# Patient Record
Sex: FEMALE | URBAN - METROPOLITAN AREA
[De-identification: names, ages, dates, MRNs, and addresses within clinical notes are randomized per-mention and may not be internally consistent; named-entity substitution may affect disease eponyms.]

---

## 2017-10-23 ENCOUNTER — TELEPHONE (OUTPATIENT)
Dept: NEUROLOGY | Facility: CLINIC | Age: 42
End: 2017-10-23

## 2017-10-23 NOTE — TELEPHONE ENCOUNTER
LM for pt advising new patient consult scheduled with Dr Hoffman 11/30 per Dr Gale from [facility]. Will mail out appt slip.

## 2019-10-09 ENCOUNTER — OFFICE (OUTPATIENT)
Dept: URBAN - METROPOLITAN AREA CLINIC 10 | Facility: CLINIC | Age: 44
End: 2019-10-09

## 2019-10-09 VITALS
WEIGHT: 186 LBS | HEART RATE: 75 BPM | HEIGHT: 66 IN | DIASTOLIC BLOOD PRESSURE: 76 MMHG | SYSTOLIC BLOOD PRESSURE: 137 MMHG

## 2019-10-09 DIAGNOSIS — R94.5 ABNORMAL RESULTS OF LIVER FUNCTION STUDIES: ICD-10-CM

## 2019-10-09 DIAGNOSIS — R10.9 UNSPECIFIED ABDOMINAL PAIN: ICD-10-CM

## 2019-10-09 DIAGNOSIS — K59.00 CONSTIPATION, UNSPECIFIED: ICD-10-CM

## 2019-10-09 DIAGNOSIS — D50.9 IRON DEFICIENCY ANEMIA, UNSPECIFIED: ICD-10-CM

## 2019-10-09 LAB
ACTIN (SMOOTH MUSCLE) ANTIBODY: 13 UNITS (ref 0–19)
ANEMIA PROFILE A: BASO (ABSOLUTE): 0 X10E3/UL (ref 0–0.2)
ANEMIA PROFILE A: BASOS: 1 %
ANEMIA PROFILE A: EOS (ABSOLUTE): 0.1 X10E3/UL (ref 0–0.4)
ANEMIA PROFILE A: EOS: 2 %
ANEMIA PROFILE A: HEMATOCRIT: 33.3 % — LOW (ref 34–46.6)
ANEMIA PROFILE A: HEMOGLOBIN: 10.6 G/DL — LOW (ref 11.1–15.9)
ANEMIA PROFILE A: IMMATURE GRANS (ABS): 0 X10E3/UL (ref 0–0.1)
ANEMIA PROFILE A: IMMATURE GRANULOCYTES: 0 %
ANEMIA PROFILE A: IRON BIND.CAP.(TIBC): 458 UG/DL — HIGH (ref 250–450)
ANEMIA PROFILE A: IRON SATURATION: 5 % — CRITICAL LOW (ref 15–55)
ANEMIA PROFILE A: IRON: 24 UG/DL — LOW (ref 27–159)
ANEMIA PROFILE A: LYMPHS (ABSOLUTE): 1.9 X10E3/UL (ref 0.7–3.1)
ANEMIA PROFILE A: LYMPHS: 46 %
ANEMIA PROFILE A: MCH: 27 PG (ref 26.6–33)
ANEMIA PROFILE A: MCHC: 31.8 G/DL (ref 31.5–35.7)
ANEMIA PROFILE A: MCV: 85 FL (ref 79–97)
ANEMIA PROFILE A: MONOCYTES(ABSOLUTE): 0.4 X10E3/UL (ref 0.1–0.9)
ANEMIA PROFILE A: MONOCYTES: 10 %
ANEMIA PROFILE A: NEUTROPHILS (ABSOLUTE): 1.6 X10E3/UL (ref 1.4–7)
ANEMIA PROFILE A: NEUTROPHILS: 41 %
ANEMIA PROFILE A: PLATELETS: 277 X10E3/UL (ref 150–450)
ANEMIA PROFILE A: RBC: 3.92 X10E6/UL (ref 3.77–5.28)
ANEMIA PROFILE A: RDW: 16.2 % — HIGH (ref 12.3–15.4)
ANEMIA PROFILE A: RETICULOCYTE COUNT: 1.3 % (ref 0.6–2.6)
ANEMIA PROFILE A: UIBC: 434 UG/DL — HIGH (ref 131–425)
ANEMIA PROFILE A: WBC: 4 X10E3/UL (ref 3.4–10.8)
COMP. METABOLIC PANEL (14): A/G RATIO: 1.7 (ref 1.2–2.2)
COMP. METABOLIC PANEL (14): ALBUMIN: 4.3 G/DL (ref 3.5–5.5)
COMP. METABOLIC PANEL (14): ALKALINE PHOSPHATASE: 117 IU/L (ref 39–117)
COMP. METABOLIC PANEL (14): ALT (SGPT): 14 IU/L (ref 0–32)
COMP. METABOLIC PANEL (14): AST (SGOT): 25 IU/L (ref 0–40)
COMP. METABOLIC PANEL (14): BILIRUBIN, TOTAL: 0.3 MG/DL (ref 0–1.2)
COMP. METABOLIC PANEL (14): BUN/CREATININE RATIO: 10 (ref 9–23)
COMP. METABOLIC PANEL (14): BUN: 9 MG/DL (ref 6–24)
COMP. METABOLIC PANEL (14): CALCIUM: 9.1 MG/DL (ref 8.7–10.2)
COMP. METABOLIC PANEL (14): CARBON DIOXIDE, TOTAL: 22 MMOL/L (ref 20–29)
COMP. METABOLIC PANEL (14): CHLORIDE: 105 MMOL/L (ref 96–106)
COMP. METABOLIC PANEL (14): CREATININE: 0.87 MG/DL (ref 0.57–1)
COMP. METABOLIC PANEL (14): EGFR IF AFRICN AM: 93 ML/MIN/1.73 (ref 59–?)
COMP. METABOLIC PANEL (14): EGFR IF NONAFRICN AM: 81 ML/MIN/1.73 (ref 59–?)
COMP. METABOLIC PANEL (14): GLOBULIN, TOTAL: 2.6 G/DL (ref 1.5–4.5)
COMP. METABOLIC PANEL (14): GLUCOSE: 66 MG/DL (ref 65–99)
COMP. METABOLIC PANEL (14): POTASSIUM: 4.1 MMOL/L (ref 3.5–5.2)
COMP. METABOLIC PANEL (14): PROTEIN, TOTAL: 6.9 G/DL (ref 6–8.5)
COMP. METABOLIC PANEL (14): SODIUM: 141 MMOL/L (ref 134–144)
HBSAG SCREEN: NEGATIVE
HCV ANTIBODY: HEP C VIRUS AB: <0.1 S/CO RATIO
HEP A AB, TOTAL: POSITIVE
HEP B SURFACE AB: HEP B SURFACE AB, QUAL: REACTIVE
MITOCHONDRIAL (M2) ANTIBODY: <20 UNITS

## 2019-10-09 PROCEDURE — 99203 OFFICE O/P NEW LOW 30 MIN: CPT | Performed by: INTERNAL MEDICINE

## 2019-10-09 RX ORDER — HYOSCYAMINE SULFATE 0.12 MG/1
TABLET ORAL; SUBLINGUAL
Qty: 90 | Refills: 3 | Status: ACTIVE
Start: 2019-10-09

## 2024-08-20 ENCOUNTER — TELEPHONE (OUTPATIENT)
Dept: FAMILY MEDICINE | Facility: CLINIC | Age: 49
End: 2024-08-20
Payer: COMMERCIAL

## 2024-08-20 NOTE — TELEPHONE ENCOUNTER
----- Message from Zaria Bernal sent at 8/20/2024  8:28 AM CDT -----  Type:  Sooner Appointment Request    Caller is requesting a sooner appointment.  Caller declined first available appointment listed below.  Caller will not accept being placed on the waitlist and is requesting a message be sent to doctor.    Name of Caller:  pt   When is the first available appointment?  N/a   Symptoms:  leg pain , hard to walk   Would the patient rather a call back or a response via [facility]? call  Best Call Back Number:  [phone] (home) [phone] (work)    Additional Information:  please advise